# Patient Record
Sex: MALE | Race: OTHER | NOT HISPANIC OR LATINO
[De-identification: names, ages, dates, MRNs, and addresses within clinical notes are randomized per-mention and may not be internally consistent; named-entity substitution may affect disease eponyms.]

---

## 2021-07-03 ENCOUNTER — APPOINTMENT (OUTPATIENT)
Dept: HUMAN REPRODUCTION | Facility: CLINIC | Age: 31
End: 2021-07-03
Payer: COMMERCIAL

## 2021-07-03 PROCEDURE — 99211 OFF/OP EST MAY X REQ PHY/QHP: CPT | Mod: 25

## 2021-07-03 PROCEDURE — 36415 COLL VENOUS BLD VENIPUNCTURE: CPT

## 2021-07-03 PROCEDURE — 99072 ADDL SUPL MATRL&STAF TM PHE: CPT

## 2021-07-10 ENCOUNTER — APPOINTMENT (OUTPATIENT)
Dept: HUMAN REPRODUCTION | Facility: CLINIC | Age: 31
End: 2021-07-10

## 2024-05-31 PROBLEM — Z00.00 ENCOUNTER FOR PREVENTIVE HEALTH EXAMINATION: Status: ACTIVE | Noted: 2024-05-31

## 2024-06-03 ENCOUNTER — APPOINTMENT (OUTPATIENT)
Dept: ORTHOPEDIC SURGERY | Facility: CLINIC | Age: 34
End: 2024-06-03
Payer: COMMERCIAL

## 2024-06-03 VITALS — BODY MASS INDEX: 29.82 KG/M2 | HEIGHT: 73 IN | WEIGHT: 225 LBS

## 2024-06-03 DIAGNOSIS — Z78.9 OTHER SPECIFIED HEALTH STATUS: ICD-10-CM

## 2024-06-03 DIAGNOSIS — S39.012A STRAIN OF MUSCLE, FASCIA AND TENDON OF LOWER BACK, INITIAL ENCOUNTER: ICD-10-CM

## 2024-06-03 PROCEDURE — 99203 OFFICE O/P NEW LOW 30 MIN: CPT

## 2024-06-03 PROCEDURE — 72100 X-RAY EXAM L-S SPINE 2/3 VWS: CPT

## 2024-06-09 NOTE — PHYSICAL EXAM
[Normal Coordination] : normal coordination [Normal DTR UE/LE] : normal DTR UE/LE  [Normal Sensation] : normal sensation [Normal Mood and Affect] : normal mood and affect [Oriented] : oriented [Normal Skin] : normal skin [No Rash] : no rash [No Ulcers] : no ulcers [No Lesions] : no lesions [No obvious lymphadenopathy in areas examined] : no obvious lymphadenopathy in areas examined [5___] : right extensor hallicus longus 5[unfilled]/5 [] : patient ambulates without assistive device [No bony abnormalities] : No bony abnormalities
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
## 2024-06-20 ENCOUNTER — APPOINTMENT (OUTPATIENT)
Dept: ORTHOPEDIC SURGERY | Facility: CLINIC | Age: 34
End: 2024-06-20

## 2024-06-20 VITALS — BODY MASS INDEX: 29.16 KG/M2 | HEIGHT: 73 IN | WEIGHT: 220 LBS

## 2024-06-20 DIAGNOSIS — M25.511 PAIN IN RIGHT SHOULDER: ICD-10-CM

## 2024-06-20 DIAGNOSIS — M25.512 PAIN IN LEFT SHOULDER: ICD-10-CM

## 2024-06-20 PROCEDURE — 99204 OFFICE O/P NEW MOD 45 MIN: CPT | Mod: 1L

## 2024-06-20 PROCEDURE — 73030 X-RAY EXAM OF SHOULDER: CPT | Mod: 1L,LT

## 2024-06-20 PROCEDURE — 72040 X-RAY EXAM NECK SPINE 2-3 VW: CPT | Mod: 1L

## 2024-06-20 PROCEDURE — 73010 X-RAY EXAM OF SHOULDER BLADE: CPT | Mod: 1L,RT

## 2024-06-20 RX ORDER — NAPROXEN 500 MG/1
500 TABLET ORAL
Qty: 60 | Refills: 0 | Status: ACTIVE | COMMUNITY
Start: 2024-06-20 | End: 1900-01-01

## 2024-06-21 NOTE — IMAGING
[Bilateral] : shoulder bilaterally [de-identified] : The patient is a well appearing 34 year male of their stated age. Neck is supple & nontender to palpation. Negative Spurling's test.   Effected Shoulder: LEFT Inspection: Scapula Winging: Negative Deformity: None Erythema: None Ecchymosis: None Abrasions: None Effusion: None   Range of Motion: Active Forward Flexion: 170 degrees Active Abduction: 170 degrees Passive Forward Flexion: 170 degrees Passive Abduction: 170 degrees ER @ 90 degrees: 90degrees IR @ 90 degrees: 20 degrees ER @ 0 degrees: 35 degrees   Motor Exam: Forward Flexion: 5 out of 5 Flexion Plane of Scapula: 5 out of 5 Abduction: 5 out of 5 Internal Rotation: 5 out of 5 External Rotation: 5 out of 5 Distal Motor Strength: 5 out of 5   Stability Testing:  Anterior: 1+ Posterior: 1+ Sulcus N: 1+ Sulcus ER: 1+ Provocative Tests: Drop Arm: Negative Impingement: Negative [place]: Negative X-Arm Adduction: Negative Belly Press: Negative Bear Hug: Negative Lift Off: Negative Apprehension: Negative Relocation: Negative Posterior Load & Shift: Negative Palpation: AC Joint: Nontender Clavicle: Nontender SC Joint: Nontender Bicipital Groove: Nontender Coracoid Process: TENDER  Pectoralis Minor Tendon: Nontender Pectoralis Major Tendon: Nontender & palpably intact Latissimus Dorsi: Nontender Proximal Humerus: Nontender Scapula Body: MID SCAPULA TENDERNESS Medial Scapula Boarder: Nontender Scapula Spine: Nontender Neurologic Exam: Sensation to Light Touch: Axillary: Grossly intact Ulnar: Grossly intact Radial: Grossly intact Median: Grossly intact Other:  N/A Circulatory/Pulses: Ulnar: 2+ Radial: 2+ Other Pertinent Findings: None  >>>>>>>>>>>>>>>>>>>>>>>>>>>>>>>>>>>>>>>>>>>>>>>>>>>>>>>>>>>>>>>>>>>>>>>   Effected Shoulder: RIGHT Inspection: Scapula Winging: Negative Deformity: None Erythema: None Ecchymosis: None Abrasions: None Effusion: None   Range of Motion: Active Forward Flexion: 180 degrees Active Abduction: 180 degrees Passive Forward Flexion: 180 degrees Passive Abduction: 180 degrees ER @ 90 degrees: 90 degrees IR @ 90 degrees: 35 degrees ER @ 0 degrees: 35 degrees   Motor Exam: Forward Flexion: 5 out of 5 Flexion Plane of Scapula: 5- out of 5 Abduction: 5- out of 5 Internal Rotation: 5 out of 5 External Rotation: 5 out of 5 Distal Motor Strength: 5 out of 5   Stability Testing: Anterior: 1+ Posterior: 1+ Sulcus N: 1+ Sulcus ER: 1+   Provocative Tests: Drop Arm: Negative Impingement: Negative [place]: Negative X-Arm Adduction: Negative Belly Press: Negative Bear Hug: Negative Lift Off: Negative Apprehension: Negative Relocation: Negative Posterior Load & Shift: Negative   Palpation: AC Joint: Nontender Nontender Clavicle: Nontender SC Joint: Nontender Bicipital Groove: Nontender Coracoid Process: TENDER  Pectoralis Minor Tendon: Nontender Pectoralis Major Tendon: Nontender & palpably intact Latissimus Dorsi: Nontender Proximal Humerus: Nontender Scapula Body: Nontender Medial Scapula Boarder: Nontender Scapula Spine: Nontender   Neurologic Exam: Sensation to Light Touch: Axillary: Grossly intact Ulnar: Grossly intact Radial: Grossly intact Median: Grossly intact Other:  N/A Circulatory/Pulses: Ulnar: 2+ Radial: 2+ Other Pertinent Findings: None         Assessment: The patient is a 34 year old male with bilateral shoulder pain and radiographic and physical exam findings consistent with bilat upper extremity shoulder pain consistent w cervical HNP/ cervicalgia  The patient's condition is acute Documents/Results Reviewed Today: X-Ray bilateral shoulders, X-Ray cervical spine Tests/Studies Independently Interpreted Today: X-Ray bilateral shoulders reveal evidence of previous surgical intervention [place] 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  X-Ray of cervical spine reveals evidence of straightening of normal lordotic curve otherwise benign  Pertinent findings include: RIGHT SHOULDER: 180/180/90/35/35, 5-/5 ABD, 5/5 FF, 5-/5 FSP,5-/5 IR AND ER, Tender coracoid,  LEFT SHOUDLER: Mid scap border tenderness, Tender Coracoid, 170/170/90/20/35,  Confounding medical conditions/concerns:  [facility] shoulder [facility]     Plan:  Due to worsening pain and instability with mechanical symptoms, patient will obtain MRI cervical spine to evaluate for possible HNP. In the interim, we reviewed appropriate use of OTC anti-inflammatories as needed for pain, inflammation, and discomfort.  Prescribed patient Naproxen 500mg BID x 2 weeks, then PRN for pain management and inflammation - use as directed and take with food. Modify activity as discussed.  Tests Ordered: MRI cervical spine Prescription Medications Ordered: Naproxen 500 mg  Braces/DME Ordered: None Activity/Work/Sports Status: None  Additional Instructions: None Follow-Up:  After MRI  The patient's current medication management of their orthopedic diagnosis was reviewed today: (1) We discussed a comprehensive treatment plan that included possible pharmaceutical management involving the use of prescription strength medications including but not limited to options such as oral Naprosyn 500mg BID, once daily Meloxicam 15 mg, or 500-650 mg Tylenol versus over the counter oral medications and topical prescription NSAID Pennsaid vs over the counter Voltaren gel.  Based on our extensive discussion, the patient was prescribed Naprosyn 500mg BID for two weeks.  It will then be used PRN for pain, inflammation and discomfort. (2) There is a moderate risk of morbidity with further treatment, especially from use of prescription strength medications and possible side effects of these medications which include upset stomach with oral medications, skin reactions to topical medications and cardiac/renal issues with long term use. (3) I recommended that the patient follow-up with their medical physician to discuss any significant specific potential issues with long term medication use such as interactions with current medications or with exacerbation of underlying medical comorbidities. (4) The benefits and risks associated with use of injectable, oral or topical, prescription and over the counter anti-inflammatory medications were discussed with the patient. The patient voiced understanding of the risks including but not limited to bleeding, stroke, kidney dysfunction, heart disease, and were referred to the black box warning label for further information.   Celine BOYLE attest that this documentation has been prepared under the direction and in the presence of Provider Dr. Kumar Corado.    The documentation recorded by the scribe accurately reflects the services IDr. Kumar, personally performed and the decisions made by me.     

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
## 2024-06-22 ENCOUNTER — RESULT REVIEW (OUTPATIENT)
Age: 34
End: 2024-06-22

## 2024-06-27 ENCOUNTER — APPOINTMENT (OUTPATIENT)
Dept: ORTHOPEDIC SURGERY | Facility: CLINIC | Age: 34
End: 2024-06-27

## 2024-06-27 VITALS — BODY MASS INDEX: 29.16 KG/M2 | WEIGHT: 220 LBS | HEIGHT: 73 IN

## 2024-06-27 DIAGNOSIS — M50.20 OTHER CERVICAL DISC DISPLACEMENT, UNSPECIFIED CERVICAL REGION: ICD-10-CM

## 2024-06-27 PROCEDURE — 99204 OFFICE O/P NEW MOD 45 MIN: CPT

## 2024-07-08 ENCOUNTER — APPOINTMENT (OUTPATIENT)
Dept: ORTHOPEDIC SURGERY | Facility: CLINIC | Age: 34
End: 2024-07-08
Payer: COMMERCIAL

## 2024-07-08 VITALS — WEIGHT: 220 LBS | HEIGHT: 73 IN | BODY MASS INDEX: 29.16 KG/M2

## 2024-07-08 DIAGNOSIS — M50.20 OTHER CERVICAL DISC DISPLACEMENT, UNSPECIFIED CERVICAL REGION: ICD-10-CM

## 2024-07-08 DIAGNOSIS — S39.012A STRAIN OF MUSCLE, FASCIA AND TENDON OF LOWER BACK, INITIAL ENCOUNTER: ICD-10-CM

## 2024-07-08 PROCEDURE — 99214 OFFICE O/P EST MOD 30 MIN: CPT

## 2024-07-17 ENCOUNTER — RESULT REVIEW (OUTPATIENT)
Age: 34
End: 2024-07-17

## 2024-08-01 ENCOUNTER — APPOINTMENT (OUTPATIENT)
Dept: PAIN MANAGEMENT | Facility: CLINIC | Age: 34
End: 2024-08-01
Payer: COMMERCIAL

## 2024-08-01 VITALS — WEIGHT: 224 LBS | HEIGHT: 73 IN | BODY MASS INDEX: 29.69 KG/M2

## 2024-08-01 DIAGNOSIS — S13.4XXA SPRAIN OF LIGAMENTS OF CERVICAL SPINE, INITIAL ENCOUNTER: ICD-10-CM

## 2024-08-01 PROCEDURE — 99214 OFFICE O/P EST MOD 30 MIN: CPT

## 2024-08-01 PROCEDURE — 99204 OFFICE O/P NEW MOD 45 MIN: CPT

## 2024-08-02 PROBLEM — S13.4XXA CHRONIC WHIPLASH INJURY, INITIAL ENCOUNTER: Status: RESOLVED | Noted: 2024-08-01 | Resolved: 2024-08-02

## 2024-08-02 NOTE — PHYSICAL EXAM
[de-identified] : Constitutional:   - No acute distress   - Well developed; well nourished    Neurological:   - normal mood and affect   - alert and oriented x 3     Cardiovascular:   - grossly normal   Cervical Spine Exam:   Inspection:   erythema (-)   ecchymosis (-)   rashes (-)    Palpation:                                                    Cervical paraspinal tenderness:         R (-); L (-)  Upper trapezius tenderness:              R (+); L (+)  Rhomboids tenderness:                      R (-); L (-)  Occipital Ridge:                                   R (-); L (-)  Supraspinatus tenderness:                 R (-); L (-)   ROM: WNL Pain with extremes of flexion  Strength Testing:              Deltoid                           R (5/5); L (5/5)  Biceps:                          R (5/5); L (5/5)  Triceps:                         R (5/5); L (5/5)  Finger Abductors:         R (5/5); L (5/5)  Grasp:                           R (5/5); L (5/5)   Special Testing:  Spurling Test:                  R (-); L (-)  Facet load test:               R (-); L (-)   Neuro:  SILT throughout right upper extremity  SILT throughout left upper extremity   Reflexes:  Biceps   -           R (2+); L (2+)  Triceps  -           R (2+); L (2+)  Brachioradialis- R (2+); L (2+)     No ankle clonus
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
## 2024-08-02 NOTE — REASON FOR VISIT
[Initial Consultation] : an initial pain management consultation [FreeTextEntry2] : Neck/low back pain pain
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
## 2024-08-02 NOTE — PHYSICAL EXAM
[de-identified] : Constitutional:   - No acute distress   - Well developed; well nourished    Neurological:   - normal mood and affect   - alert and oriented x 3     Cardiovascular:   - grossly normal   Cervical Spine Exam:   Inspection:   erythema (-)   ecchymosis (-)   rashes (-)    Palpation:                                                    Cervical paraspinal tenderness:         R (-); L (-)  Upper trapezius tenderness:              R (+); L (+)  Rhomboids tenderness:                      R (-); L (-)  Occipital Ridge:                                   R (-); L (-)  Supraspinatus tenderness:                 R (-); L (-)   ROM: WNL Pain with extremes of flexion  Strength Testing:              Deltoid                           R (5/5); L (5/5)  Biceps:                          R (5/5); L (5/5)  Triceps:                         R (5/5); L (5/5)  Finger Abductors:         R (5/5); L (5/5)  Grasp:                           R (5/5); L (5/5)   Special Testing:  Spurling Test:                  R (-); L (-)  Facet load test:               R (-); L (-)   Neuro:  SILT throughout right upper extremity  SILT throughout left upper extremity   Reflexes:  Biceps   -           R (2+); L (2+)  Triceps  -           R (2+); L (2+)  Brachioradialis- R (2+); L (2+)     No ankle clonus

## 2024-08-02 NOTE — ASSESSMENT
[FreeTextEntry1] : A discussion regarding available pain management treatment options occurred with the patient.  These included interventional, rehabilitative, pharmacological, and alternative modalities. We will proceed with the following:    Interventional treatment options: - None indicated at present time  - We have mutually agreed to defer interventional treatment until exhausting further conservative therapy - Discussed trial of TPI for refractory cervical myofascial pain - see additional instructions below    Rehabilitative options:   - continue physical therapy   - participation in active HEP was discussed and encouraged as tolerated  Medication based treatment options:   -Continue naproxen 500 mg up to BID as needed -Patient prefers to avoid medication based therapies overall - see additional instructions below    Complementary treatment options:   - Weight management and lifestyle modifications discussed - Discussed options including massage therapy, acupuncture, and chiropractic  Additional treatment recommendations as follows:   - patient will follow-up in 6 weeks or as-needed basis  We have discussed the risks, benefits, and alternatives for NSAID therapy including but not limited to the risk of bleeding, thrombosis, gastric mucosal irritation/ulceration, allergic reaction and kidney dysfunction.  The patient verbalizes an understanding.  The documentation recorded by the scribe, in my presence, accurately reflects the service I personally performed and the decisions made by me with my edits as appropriate.   I, Korey Queen acting as scribe, attest that this documentation has been prepared under the direction and in the presence of Provider New Andres DO.

## 2024-08-05 ENCOUNTER — APPOINTMENT (OUTPATIENT)
Dept: ORTHOPEDIC SURGERY | Facility: CLINIC | Age: 34
End: 2024-08-05

## 2024-08-05 PROBLEM — M79.18 MYOFASCIAL PAIN SYNDROME, CERVICAL: Status: ACTIVE | Noted: 2024-08-02

## 2024-08-05 PROBLEM — M54.12 RADICULITIS, CERVICAL: Status: ACTIVE | Noted: 2024-08-01

## 2024-08-05 PROCEDURE — 99213 OFFICE O/P EST LOW 20 MIN: CPT

## 2024-08-11 NOTE — IMAGING
[de-identified] : The patient is a well appearing 34 year male of their stated age. Neck is supple & nontender to palpation. Negative Spurling's test.   Effected Shoulder: LEFT Inspection: Scapula Winging: Negative Deformity: None Erythema: None Ecchymosis: None Abrasions: None Effusion: None   Range of Motion: Active Forward Flexion: 170 degrees Active Abduction: 170 degrees Passive Forward Flexion: 170 degrees Passive Abduction: 170 degrees ER @ 90 degrees: 90degrees IR @ 90 degrees: 20 degrees ER @ 0 degrees: 35 degrees   Motor Exam: Forward Flexion: 5 out of 5 Flexion Plane of Scapula: 5 out of 5 Abduction: 5 out of 5 Internal Rotation: 5 out of 5 External Rotation: 5 out of 5 Distal Motor Strength: 5 out of 5   Stability Testing:  Anterior: 1+ Posterior: 1+ Sulcus N: 1+ Sulcus ER: 1+ Provocative Tests: Drop Arm: Negative Impingement: Negative [place]: Negative X-Arm Adduction: Negative Belly Press: Negative Bear Hug: Negative Lift Off: Negative Apprehension: Negative Relocation: Negative Posterior Load & Shift: Negative Palpation: AC Joint: Nontender Clavicle: Nontender SC Joint: Nontender Bicipital Groove: Nontender Coracoid Process: TENDER  Pectoralis Minor Tendon: Nontender Pectoralis Major Tendon: Nontender & palpably intact Latissimus Dorsi: Nontender Proximal Humerus: Nontender Scapula Body: MID SCAPULA TENDERNESS Medial Scapula Boarder: Nontender Scapula Spine: Nontender Neurologic Exam: Sensation to Light Touch: Axillary: Grossly intact Ulnar: Grossly intact Radial: Grossly intact Median: Grossly intact Other:  N/A Circulatory/Pulses: Ulnar: 2+ Radial: 2+ Other Pertinent Findings: None  >>>>>>>>>>>>>>>>>>>>>>>>>>>>>>>>>>>>>>>>>>>>>>>>>>>>>>>>>>>>>>>>>>>>>>>   Effected Shoulder: RIGHT Inspection: Scapula Winging: Negative Deformity: None Erythema: None Ecchymosis: None Abrasions: None Effusion: None   Range of Motion: Active Forward Flexion: 180 degrees Active Abduction: 180 degrees Passive Forward Flexion: 180 degrees Passive Abduction: 180 degrees ER @ 90 degrees: 90 degrees IR @ 90 degrees: 35 degrees ER @ 0 degrees: 35 degrees   Motor Exam: Forward Flexion: 5 out of 5 Flexion Plane of Scapula: 5- out of 5 Abduction: 5- out of 5 Internal Rotation: 5 out of 5 External Rotation: 5 out of 5 Distal Motor Strength: 5 out of 5   Stability Testing: Anterior: 1+ Posterior: 1+ Sulcus N: 1+ Sulcus ER: 1+   Provocative Tests: Drop Arm: Negative Impingement: Negative [place]: Negative X-Arm Adduction: Negative Belly Press: Negative Bear Hug: Negative Lift Off: Negative Apprehension: Negative Relocation: Negative Posterior Load & Shift: Negative   Palpation: AC Joint: Nontender Nontender Clavicle: Nontender SC Joint: Nontender Bicipital Groove: Nontender Coracoid Process: TENDER  Pectoralis Minor Tendon: Nontender Pectoralis Major Tendon: Nontender & palpably intact Latissimus Dorsi: Nontender Proximal Humerus: Nontender Scapula Body: Nontender Medial Scapula Boarder: Nontender Scapula Spine: Nontender   Neurologic Exam: Sensation to Light Touch: Axillary: Grossly intact Ulnar: Grossly intact Radial: Grossly intact Median: Grossly intact Other:  N/A Circulatory/Pulses: Ulnar: 2+ Radial: 2+ Other Pertinent Findings: None   Assessment: The patient is a 34 year old male with bilateral shoulder pain and radiographic and physical exam findings consistent with cervical disc herniation The patient's condition is acute Documents/Results Reviewed Today: MRI cervical spine Tests/Studies Independently Interpreted Today: MRI cervical spine reveals evidence of C5-C6, C6-C7 disc herniation worse on left X-Ray of cervical spine reveals evidence of straightening of normal lordotic curve otherwise benign  Pertinent findings include: RIGHT SHOULDER: 180/180/90/35/35, 5-/5 ABD, 5/5 FF, 5-/5 FSP,5-/5 IR AND ER, Tender coracoid,  LEFT SHOUDLER: Mid scap border tenderness, Tender Coracoid, 170/170/90/20/35,  Confounding medical conditions/concerns:  Left shoulder Labral repair with Dr. Corado at [facility] in 2017   Plan: Discussed treatment options for the patient's disc herniation. Patient will start physical therapy, HEP and stretching. The patient is prescribed a Medrol dose pack to be taken as directed for the next five days - no NSAID medication should be taken concurrently with steroid pack. Upon completion of MDP, the patient may take OTC oral NSAIDs as needed. He will follow up with pain Managment.  Tests Ordered: None Prescription Medications Ordered: Medrol dose pack  Braces/DME Ordered: None Activity/Work/Sports Status: None  Additional Instructions: None Follow-Up: with pain management   The patient's current medication management of their orthopedic diagnosis was reviewed today: (1) We discussed a comprehensive treatment plan that included possible pharmaceutical management involving the use of prescription strength medications including but not limited to options such as oral Naprosyn 500mg BID, once daily Meloxicam 15 mg, or 500-650 mg Tylenol versus over the counter oral medications and topical prescription NSAID Pennsaid vs over the counter Voltaren gel.  Based on our extensive discussion, the patient was prescribed a Medrol Dose Pack to be taken as directed for the next five days.  Following which OTC NSAIDs may be used PRN for pain, inflammation, and discomfort.  No NSAID medications should be taken concurrently with the Medrol Dose Pack. (2) There is a moderate risk of morbidity with further treatment, especially from use of prescription strength medications and possible side effects of these medications which include upset stomach with oral medications, skin reactions to topical medications and cardiac/renal issues with long term use. (3) I recommended that the patient follow-up with their medical physician to discuss any significant specific potential issues with long term medication use such as interactions with current medications or with exacerbation of underlying medical comorbidities. (4) The benefits and risks associated with use of injectable, oral or topical, prescription and over the counter anti-inflammatory medications were discussed with the patient. The patient voiced understanding of the risks including but not limited to bleeding, stroke, kidney dysfunction, heart disease, and were referred to the black box warning label for further information.  Celine BOYLE attest that this documentation has been prepared under the direction and in the presence of Provider Dr. Kumar Corado.  The documentation recorded by the scribe accurately reflects the services Dr. Kumar BOYLE, personally performed and the decisions made by me.

## 2024-08-11 NOTE — PHYSICAL EXAM
[Normal Coordination] : normal coordination [Normal DTR UE/LE] : normal DTR UE/LE  [Normal Sensation] : normal sensation [Normal Mood and Affect] : normal mood and affect [Oriented] : oriented [Normal Skin] : normal skin [No Rash] : no rash [No Ulcers] : no ulcers [No Lesions] : no lesions [No obvious lymphadenopathy in areas examined] : no obvious lymphadenopathy in areas examined [NL (90)] : forward flexion 90 degrees [Bending to left] : bending to left [Bending to right] : bending to right [5___] : right extensor hallicus longus 5[unfilled]/5 [No bony abnormalities] : No bony abnormalities [NL (80)] : right lateral rotation 80 degrees [TWNoteComboBox7] : forward flexion 30 degrees [] : clonus not sustained at ankle [de-identified] : mild + alarcon on LT.  [de-identified] : extension 20 degrees [de-identified] : left lateral bending 20 degrees [de-identified] : right lateral bending 20 degrees

## 2024-08-11 NOTE — DISCUSSION/SUMMARY
[de-identified] : 34 M with cervical & lumbar spondylosis.  Symptoms appear mostly muscular in nature. Normal neuro exam today, MRIs reviewed & discussed with patient today. Best to treat conservatively at this time. He already has established care with Dr. Andres, may follow up if needed. Patient will continue with current physical therapy routine, renewal given due to patients overall reduction in pain and mobility continuing cervical & lumbar physical therapy will be helpful at this time.   F/U in 2 months.   Prior to appointment and during encounter with patient extensive medical records were reviewed including but not limited to, hospital records, out patient records, imaging results, and lab data. During this appointment the patient was examined, diagnoses were discussed and explained in a face to face manner. In addition extensive time was spent reviewing aforementioned diagnostic studies. Counseling including abnormal image results, differential diagnoses, treatment options, risk and benefits, lifestyle changes, current condition, and current medications was performed. Patient's comments, questions, and concerns were address and patient verbalized understanding. Based on this patient's presentation at our office, which is an orthopedic spine surgeon's office, this patient inherently / intrinsically has a risk, however minute, of developing issues such as Cauda equina syndrome, bowel and bladder changes, or progression of motor or neurological deficits such as paralysis which may be permanent.   I, Belinda Olvera, attest that this documentation has been prepared under the direction and in the presence of provider Markel James MD.

## 2024-08-11 NOTE — IMAGING
[de-identified] : The patient is a well appearing 34 year male of their stated age. Neck is supple & nontender to palpation. Negative Spurling's test.   Effected Shoulder: LEFT Inspection: Scapula Winging: Negative Deformity: None Erythema: None Ecchymosis: None Abrasions: None Effusion: None   Range of Motion: Active Forward Flexion: 170 degrees Active Abduction: 170 degrees Passive Forward Flexion: 170 degrees Passive Abduction: 170 degrees ER @ 90 degrees: 90degrees IR @ 90 degrees: 20 degrees ER @ 0 degrees: 35 degrees   Motor Exam: Forward Flexion: 5 out of 5 Flexion Plane of Scapula: 5 out of 5 Abduction: 5 out of 5 Internal Rotation: 5 out of 5 External Rotation: 5 out of 5 Distal Motor Strength: 5 out of 5   Stability Testing:  Anterior: 1+ Posterior: 1+ Sulcus N: 1+ Sulcus ER: 1+ Provocative Tests: Drop Arm: Negative Impingement: Negative [place]: Negative X-Arm Adduction: Negative Belly Press: Negative Bear Hug: Negative Lift Off: Negative Apprehension: Negative Relocation: Negative Posterior Load & Shift: Negative Palpation: AC Joint: Nontender Clavicle: Nontender SC Joint: Nontender Bicipital Groove: Nontender Coracoid Process: TENDER  Pectoralis Minor Tendon: Nontender Pectoralis Major Tendon: Nontender & palpably intact Latissimus Dorsi: Nontender Proximal Humerus: Nontender Scapula Body: MID SCAPULA TENDERNESS Medial Scapula Boarder: Nontender Scapula Spine: Nontender Neurologic Exam: Sensation to Light Touch: Axillary: Grossly intact Ulnar: Grossly intact Radial: Grossly intact Median: Grossly intact Other:  N/A Circulatory/Pulses: Ulnar: 2+ Radial: 2+ Other Pertinent Findings: None  >>>>>>>>>>>>>>>>>>>>>>>>>>>>>>>>>>>>>>>>>>>>>>>>>>>>>>>>>>>>>>>>>>>>>>>   Effected Shoulder: RIGHT Inspection: Scapula Winging: Negative Deformity: None Erythema: None Ecchymosis: None Abrasions: None Effusion: None   Range of Motion: Active Forward Flexion: 180 degrees Active Abduction: 180 degrees Passive Forward Flexion: 180 degrees Passive Abduction: 180 degrees ER @ 90 degrees: 90 degrees IR @ 90 degrees: 35 degrees ER @ 0 degrees: 35 degrees   Motor Exam: Forward Flexion: 5 out of 5 Flexion Plane of Scapula: 5- out of 5 Abduction: 5- out of 5 Internal Rotation: 5 out of 5 External Rotation: 5 out of 5 Distal Motor Strength: 5 out of 5   Stability Testing: Anterior: 1+ Posterior: 1+ Sulcus N: 1+ Sulcus ER: 1+   Provocative Tests: Drop Arm: Negative Impingement: Negative [place]: Negative X-Arm Adduction: Negative Belly Press: Negative Bear Hug: Negative Lift Off: Negative Apprehension: Negative Relocation: Negative Posterior Load & Shift: Negative   Palpation: AC Joint: Nontender Nontender Clavicle: Nontender SC Joint: Nontender Bicipital Groove: Nontender Coracoid Process: TENDER  Pectoralis Minor Tendon: Nontender Pectoralis Major Tendon: Nontender & palpably intact Latissimus Dorsi: Nontender Proximal Humerus: Nontender Scapula Body: Nontender Medial Scapula Boarder: Nontender Scapula Spine: Nontender   Neurologic Exam: Sensation to Light Touch: Axillary: Grossly intact Ulnar: Grossly intact Radial: Grossly intact Median: Grossly intact Other:  N/A Circulatory/Pulses: Ulnar: 2+ Radial: 2+ Other Pertinent Findings: None   Assessment: The patient is a 34 year old male with bilateral shoulder pain and radiographic and physical exam findings consistent with cervical disc herniation The patient's condition is acute Documents/Results Reviewed Today: MRI cervical spine Tests/Studies Independently Interpreted Today: MRI cervical spine reveals evidence of C5-C6, C6-C7 disc herniation worse on left X-Ray of cervical spine reveals evidence of straightening of normal lordotic curve otherwise benign  Pertinent findings include: RIGHT SHOULDER: 180/180/90/35/35, 5-/5 ABD, 5/5 FF, 5-/5 FSP,5-/5 IR AND ER, Tender coracoid,  LEFT SHOUDLER: Mid scap border tenderness, Tender Coracoid, 170/170/90/20/35,  Confounding medical conditions/concerns:  Left shoulder Labral repair with Dr. Corado at [facility] in 2017   Plan: Discussed treatment options for the patient's disc herniation. Patient will start physical therapy, HEP and stretching. The patient is prescribed a Medrol dose pack to be taken as directed for the next five days - no NSAID medication should be taken concurrently with steroid pack. Upon completion of MDP, the patient may take OTC oral NSAIDs as needed. He will follow up with pain Managment.  Tests Ordered: None Prescription Medications Ordered: Medrol dose pack  Braces/DME Ordered: None Activity/Work/Sports Status: None  Additional Instructions: None Follow-Up: with pain management   The patient's current medication management of their orthopedic diagnosis was reviewed today: (1) We discussed a comprehensive treatment plan that included possible pharmaceutical management involving the use of prescription strength medications including but not limited to options such as oral Naprosyn 500mg BID, once daily Meloxicam 15 mg, or 500-650 mg Tylenol versus over the counter oral medications and topical prescription NSAID Pennsaid vs over the counter Voltaren gel.  Based on our extensive discussion, the patient was prescribed a Medrol Dose Pack to be taken as directed for the next five days.  Following which OTC NSAIDs may be used PRN for pain, inflammation, and discomfort.  No NSAID medications should be taken concurrently with the Medrol Dose Pack. (2) There is a moderate risk of morbidity with further treatment, especially from use of prescription strength medications and possible side effects of these medications which include upset stomach with oral medications, skin reactions to topical medications and cardiac/renal issues with long term use. (3) I recommended that the patient follow-up with their medical physician to discuss any significant specific potential issues with long term medication use such as interactions with current medications or with exacerbation of underlying medical comorbidities. (4) The benefits and risks associated with use of injectable, oral or topical, prescription and over the counter anti-inflammatory medications were discussed with the patient. The patient voiced understanding of the risks including but not limited to bleeding, stroke, kidney dysfunction, heart disease, and were referred to the black box warning label for further information.  Celine BOYLE attest that this documentation has been prepared under the direction and in the presence of Provider Dr. Kumar Corado.  The documentation recorded by the scribe accurately reflects the services Dr. Kumar BOYLE, personally performed and the decisions made by me.

## 2024-08-11 NOTE — PHYSICAL EXAM
[Normal Coordination] : normal coordination [Normal DTR UE/LE] : normal DTR UE/LE  [Normal Sensation] : normal sensation [Normal Mood and Affect] : normal mood and affect [Oriented] : oriented [Normal Skin] : normal skin [No Rash] : no rash [No Ulcers] : no ulcers [No Lesions] : no lesions [No obvious lymphadenopathy in areas examined] : no obvious lymphadenopathy in areas examined [NL (90)] : forward flexion 90 degrees [Bending to left] : bending to left [Bending to right] : bending to right [5___] : right extensor hallicus longus 5[unfilled]/5 [No bony abnormalities] : No bony abnormalities [NL (80)] : right lateral rotation 80 degrees [TWNoteComboBox7] : forward flexion 30 degrees [] : non-antalgic [de-identified] : mild + alarcon on LT.  [de-identified] : extension 20 degrees [de-identified] : left lateral bending 20 degrees [de-identified] : right lateral bending 20 degrees

## 2024-08-11 NOTE — DATA REVIEWED
[MRI] : MRI [Cervical Spine] : cervical spine [Report was reviewed and noted in the chart] : The report was reviewed and noted in the chart [I independently reviewed and interpreted images and report] : I independently reviewed and interpreted images and report [I reviewed the films/CD and additionally noted] : I reviewed the films/CD and additionally noted [FreeTextEntry1] : On my interpretation of these images from [facility] on 07/17/24.  I have additionally reviewed the radiologist report. LUMBAR MRI- sagittal & axial view.  L5-S1:  LT sided mild fs L4-5: disc bulge L3-4: nl L2-3: nl   L1-2: nl  T12-L1: nl    On my interpretation of these images from 6/22/24 [facility] I have additionally reviewed the radiologist report. CERVICAL MRI C2-3: normal C3-4: mild DDD, mild BL FS C4-5: disc bulge C5-6: mild FS, mild facet hypertrophy. C6-7: normal C7-T1: normal

## 2024-08-11 NOTE — DISCUSSION/SUMMARY
[de-identified] : 34 M with cervical & lumbar spondylosis.  Symptoms appear mostly muscular in nature. Normal neuro exam today, MRIs reviewed & discussed with patient today. Best to treat conservatively at this time. He already has established care with Dr. Andres, may follow up if needed. Patient will continue with current physical therapy routine, renewal given due to patients overall reduction in pain and mobility continuing cervical & lumbar physical therapy will be helpful at this time.   F/U in 2 months.   Prior to appointment and during encounter with patient extensive medical records were reviewed including but not limited to, hospital records, out patient records, imaging results, and lab data. During this appointment the patient was examined, diagnoses were discussed and explained in a face to face manner. In addition extensive time was spent reviewing aforementioned diagnostic studies. Counseling including abnormal image results, differential diagnoses, treatment options, risk and benefits, lifestyle changes, current condition, and current medications was performed. Patient's comments, questions, and concerns were address and patient verbalized understanding. Based on this patient's presentation at our office, which is an orthopedic spine surgeon's office, this patient inherently / intrinsically has a risk, however minute, of developing issues such as Cauda equina syndrome, bowel and bladder changes, or progression of motor or neurological deficits such as paralysis which may be permanent.   I, Belinda Olvera, attest that this documentation has been prepared under the direction and in the presence of provider Markel James MD.

## 2024-08-11 NOTE — DATA REVIEWED
[MRI] : MRI [Cervical Spine] : cervical spine [Report was reviewed and noted in the chart] : The report was reviewed and noted in the chart [I independently reviewed and interpreted images and report] : I independently reviewed and interpreted images and report [I reviewed the films/CD and additionally noted] : I reviewed the films/CD and additionally noted [FreeTextEntry1] : On my interpretation of these images from [facility] on 07/17/24.  I have additionally reviewed the radiologist report. LUMBAR MRI- sagittal & axial view.  L5-S1:  LT sided mild fs L4-5: disc bulge L3-4: nl L2-3: nl   L1-2: nl  T12-L1: nl    On my interpretation of these images from 6/22/24 [facility] I have additionally reviewed the radiologist report. CERVICAL MRI C2-3: normal C3-4: mild DDD, mild BL FS C4-5: disc bulge C5-6: mild FS, mild facet hypertrophy. C6-7: normal C7-T1: normal
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
## 2024-08-13 ENCOUNTER — TRANSCRIPTION ENCOUNTER (OUTPATIENT)
Age: 34
End: 2024-08-13

## 2024-09-30 ENCOUNTER — APPOINTMENT (OUTPATIENT)
Dept: ORTHOPEDIC SURGERY | Facility: CLINIC | Age: 34
End: 2024-09-30
Payer: COMMERCIAL

## 2024-09-30 VITALS — HEIGHT: 73 IN | BODY MASS INDEX: 29.82 KG/M2 | WEIGHT: 225 LBS

## 2024-09-30 DIAGNOSIS — M79.18 MYALGIA, OTHER SITE: ICD-10-CM

## 2024-09-30 DIAGNOSIS — M50.20 OTHER CERVICAL DISC DISPLACEMENT, UNSPECIFIED CERVICAL REGION: ICD-10-CM

## 2024-09-30 DIAGNOSIS — S39.012A STRAIN OF MUSCLE, FASCIA AND TENDON OF LOWER BACK, INITIAL ENCOUNTER: ICD-10-CM

## 2024-09-30 PROCEDURE — 99213 OFFICE O/P EST LOW 20 MIN: CPT

## 2024-10-05 NOTE — PHYSICAL EXAM
[Bending to left] : bending to left [Bending to right] : bending to right [de-identified] : mild + alarcon on LT.  [Normal Coordination] : normal coordination [Normal DTR UE/LE] : normal DTR UE/LE  [Normal Sensation] : normal sensation [Normal Mood and Affect] : normal mood and affect [Oriented] : oriented [Normal Skin] : normal skin [No Rash] : no rash [No Ulcers] : no ulcers [No Lesions] : no lesions [No obvious lymphadenopathy in areas examined] : no obvious lymphadenopathy in areas examined [NL (45)] : right lateral flexion 45 degrees [NL (80)] : right lateral rotation 80 degrees [Biceps 2+] : biceps 2+ [Triceps 2+] : triceps 2+ [Brachioradialis 2+] : brachioradialis 2+ [NL (90)] : forward flexion 90 degrees [NL (30)] : right lateral bending 30 degrees [5___] : right extensor hallicus longus 5[unfilled]/5 [No bony abnormalities] : No bony abnormalities [TWNoteComboBox7] : False [] : non-antalgic [de-identified] : False [de-identified] : False [de-identified] : False

## 2024-10-05 NOTE — DISCUSSION/SUMMARY
[de-identified] : 34 M with cervical & lumbar spondylosis.  Symptoms appear mostly muscular in nature. Normal neuro cervical & lumbar exam today. Best to treat conservatively at this time. He already has established care with Dr. Andres, may follow up if needed. Patient will continue with current physical therapy routine, renewal given due to patient's overall reduction in pain and mobility continuing cervical & lumbar physical therapy will be helpful at this time.  Medical massage prescription given today to aid in symptom control. Discussed treating with acupuncture if pt wishes.   Moving forward I'd like to see as needed.   Prior to appointment and during encounter with patient extensive medical records were reviewed including but not limited to, hospital records, out patient records, imaging results, and lab data. During this appointment the patient was examined, diagnoses were discussed and explained in a face to face manner. In addition extensive time was spent reviewing aforementioned diagnostic studies. Counseling including abnormal image results, differential diagnoses, treatment options, risk and benefits, lifestyle changes, current condition, and current medications was performed. Patient's comments, questions, and concerns were address and patient verbalized understanding. Based on this patient's presentation at our office, which is an orthopedic spine surgeon's office, this patient inherently / intrinsically has a risk, however minute, of developing issues such as Cauda equina syndrome, bowel and bladder changes, or progression of motor or neurological deficits such as paralysis which may be permanent.   I, Belinda Olvera, attest that this documentation has been prepared under the direction and in the presence of provider Markel James MD.

## 2024-10-05 NOTE — IMAGING
[de-identified] : The patient is a well appearing 34 year male of their stated age. Neck is supple & nontender to palpation. Negative Spurling's test.   Effected Shoulder: LEFT Inspection: Scapula Winging: Negative Deformity: None Erythema: None Ecchymosis: None Abrasions: None Effusion: None   Range of Motion: Active Forward Flexion: 170 degrees Active Abduction: 170 degrees Passive Forward Flexion: 170 degrees Passive Abduction: 170 degrees ER @ 90 degrees: 90degrees IR @ 90 degrees: 20 degrees ER @ 0 degrees: 35 degrees   Motor Exam: Forward Flexion: 5 out of 5 Flexion Plane of Scapula: 5 out of 5 Abduction: 5 out of 5 Internal Rotation: 5 out of 5 External Rotation: 5 out of 5 Distal Motor Strength: 5 out of 5   Stability Testing:  Anterior: 1+ Posterior: 1+ Sulcus N: 1+ Sulcus ER: 1+ Provocative Tests: Drop Arm: Negative Impingement: Negative [place]: Negative X-Arm Adduction: Negative Belly Press: Negative Bear Hug: Negative Lift Off: Negative Apprehension: Negative Relocation: Negative Posterior Load & Shift: Negative Palpation: AC Joint: Nontender Clavicle: Nontender SC Joint: Nontender Bicipital Groove: Nontender Coracoid Process: TENDER  Pectoralis Minor Tendon: Nontender Pectoralis Major Tendon: Nontender & palpably intact Latissimus Dorsi: Nontender Proximal Humerus: Nontender Scapula Body: MID SCAPULA TENDERNESS Medial Scapula Boarder: Nontender Scapula Spine: Nontender Neurologic Exam: Sensation to Light Touch: Axillary: Grossly intact Ulnar: Grossly intact Radial: Grossly intact Median: Grossly intact Other:  N/A Circulatory/Pulses: Ulnar: 2+ Radial: 2+ Other Pertinent Findings: None  >>>>>>>>>>>>>>>>>>>>>>>>>>>>>>>>>>>>>>>>>>>>>>>>>>>>>>>>>>>>>>>>>>>>>>>   Effected Shoulder: RIGHT Inspection: Scapula Winging: Negative Deformity: None Erythema: None Ecchymosis: None Abrasions: None Effusion: None   Range of Motion: Active Forward Flexion: 180 degrees Active Abduction: 180 degrees Passive Forward Flexion: 180 degrees Passive Abduction: 180 degrees ER @ 90 degrees: 90 degrees IR @ 90 degrees: 35 degrees ER @ 0 degrees: 35 degrees   Motor Exam: Forward Flexion: 5 out of 5 Flexion Plane of Scapula: 5- out of 5 Abduction: 5- out of 5 Internal Rotation: 5 out of 5 External Rotation: 5 out of 5 Distal Motor Strength: 5 out of 5   Stability Testing: Anterior: 1+ Posterior: 1+ Sulcus N: 1+ Sulcus ER: 1+   Provocative Tests: Drop Arm: Negative Impingement: Negative [place]: Negative X-Arm Adduction: Negative Belly Press: Negative Bear Hug: Negative Lift Off: Negative Apprehension: Negative Relocation: Negative Posterior Load & Shift: Negative   Palpation: AC Joint: Nontender Nontender Clavicle: Nontender SC Joint: Nontender Bicipital Groove: Nontender Coracoid Process: TENDER  Pectoralis Minor Tendon: Nontender Pectoralis Major Tendon: Nontender & palpably intact Latissimus Dorsi: Nontender Proximal Humerus: Nontender Scapula Body: Nontender Medial Scapula Boarder: Nontender Scapula Spine: Nontender   Neurologic Exam: Sensation to Light Touch: Axillary: Grossly intact Ulnar: Grossly intact Radial: Grossly intact Median: Grossly intact Other:  N/A Circulatory/Pulses: Ulnar: 2+ Radial: 2+ Other Pertinent Findings: None   Assessment: The patient is a 34 year old male with bilateral shoulder pain and radiographic and physical exam findings consistent with cervical disc herniation The patient's condition is acute Documents/Results Reviewed Today: MRI cervical spine Tests/Studies Independently Interpreted Today: MRI cervical spine reveals evidence of C5-C6, C6-C7 disc herniation worse on left X-Ray of cervical spine reveals evidence of straightening of normal lordotic curve otherwise benign  Pertinent findings include: RIGHT SHOULDER: 180/180/90/35/35, 5-/5 ABD, 5/5 FF, 5-/5 FSP,5-/5 IR AND ER, Tender coracoid,  LEFT SHOUDLER: Mid scap border tenderness, Tender Coracoid, 170/170/90/20/35,  Confounding medical conditions/concerns:  Left shoulder Labral repair with Dr. Corado at [facility] in 2017   Plan: Discussed treatment options for the patient's disc herniation. Patient will start physical therapy, HEP and stretching. The patient is prescribed a Medrol dose pack to be taken as directed for the next five days - no NSAID medication should be taken concurrently with steroid pack. Upon completion of MDP, the patient may take OTC oral NSAIDs as needed. He will follow up with pain Managment.  Tests Ordered: None Prescription Medications Ordered: Medrol dose pack  Braces/DME Ordered: None Activity/Work/Sports Status: None  Additional Instructions: None Follow-Up: with pain management   The patient's current medication management of their orthopedic diagnosis was reviewed today: (1) We discussed a comprehensive treatment plan that included possible pharmaceutical management involving the use of prescription strength medications including but not limited to options such as oral Naprosyn 500mg BID, once daily Meloxicam 15 mg, or 500-650 mg Tylenol versus over the counter oral medications and topical prescription NSAID Pennsaid vs over the counter Voltaren gel.  Based on our extensive discussion, the patient was prescribed a Medrol Dose Pack to be taken as directed for the next five days.  Following which OTC NSAIDs may be used PRN for pain, inflammation, and discomfort.  No NSAID medications should be taken concurrently with the Medrol Dose Pack. (2) There is a moderate risk of morbidity with further treatment, especially from use of prescription strength medications and possible side effects of these medications which include upset stomach with oral medications, skin reactions to topical medications and cardiac/renal issues with long term use. (3) I recommended that the patient follow-up with their medical physician to discuss any significant specific potential issues with long term medication use such as interactions with current medications or with exacerbation of underlying medical comorbidities. (4) The benefits and risks associated with use of injectable, oral or topical, prescription and over the counter anti-inflammatory medications were discussed with the patient. The patient voiced understanding of the risks including but not limited to bleeding, stroke, kidney dysfunction, heart disease, and were referred to the black box warning label for further information.  Celine BOYLE attest that this documentation has been prepared under the direction and in the presence of Provider Dr. Kumar Corado.  The documentation recorded by the scribe accurately reflects the services Dr. Kumar BOYLE, personally performed and the decisions made by me.
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
## 2024-10-05 NOTE — DISCUSSION/SUMMARY
[de-identified] : 34 M with cervical & lumbar spondylosis.  Symptoms appear mostly muscular in nature. Normal neuro cervical & lumbar exam today. Best to treat conservatively at this time. He already has established care with Dr. Andres, may follow up if needed. Patient will continue with current physical therapy routine, renewal given due to patient's overall reduction in pain and mobility continuing cervical & lumbar physical therapy will be helpful at this time.  Medical massage prescription given today to aid in symptom control. Discussed treating with acupuncture if pt wishes.   Moving forward I'd like to see as needed.   Prior to appointment and during encounter with patient extensive medical records were reviewed including but not limited to, hospital records, out patient records, imaging results, and lab data. During this appointment the patient was examined, diagnoses were discussed and explained in a face to face manner. In addition extensive time was spent reviewing aforementioned diagnostic studies. Counseling including abnormal image results, differential diagnoses, treatment options, risk and benefits, lifestyle changes, current condition, and current medications was performed. Patient's comments, questions, and concerns were address and patient verbalized understanding. Based on this patient's presentation at our office, which is an orthopedic spine surgeon's office, this patient inherently / intrinsically has a risk, however minute, of developing issues such as Cauda equina syndrome, bowel and bladder changes, or progression of motor or neurological deficits such as paralysis which may be permanent.   I, Belinda Olvera, attest that this documentation has been prepared under the direction and in the presence of provider Markel James MD.

## 2024-10-05 NOTE — DATA REVIEWED
[MRI] : MRI [Cervical Spine] : cervical spine [Report was reviewed and noted in the chart] : The report was reviewed and noted in the chart [I independently reviewed and interpreted images and report] : I independently reviewed and interpreted images and report [I reviewed the films/CD and additionally noted] : I reviewed the films/CD and additionally noted [FreeTextEntry1] : On my interpretation of these images from [facility] on 07/17/24.  I have additionally reviewed the radiologist report. LUMBAR MRI- sagittal & axial view.  L5-S1:  LT sided mild fs L4-5: disc bulge L3-4: nl L2-3: nl   L1-2: nl  T12-L1: nl    On my interpretation of these images from 6/22/24 [facility] I have additionally reviewed the radiologist report. CERVICAL MRI C2-3: normal C3-4: mild DDD, mild BL FS C4-5: disc bulge C5-6: mild FS, mild facet hypertrophy. C6-7: normal C7-T1: normal

## 2024-10-05 NOTE — IMAGING
[de-identified] : The patient is a well appearing 34 year male of their stated age. Neck is supple & nontender to palpation. Negative Spurling's test.   Effected Shoulder: LEFT Inspection: Scapula Winging: Negative Deformity: None Erythema: None Ecchymosis: None Abrasions: None Effusion: None   Range of Motion: Active Forward Flexion: 170 degrees Active Abduction: 170 degrees Passive Forward Flexion: 170 degrees Passive Abduction: 170 degrees ER @ 90 degrees: 90degrees IR @ 90 degrees: 20 degrees ER @ 0 degrees: 35 degrees   Motor Exam: Forward Flexion: 5 out of 5 Flexion Plane of Scapula: 5 out of 5 Abduction: 5 out of 5 Internal Rotation: 5 out of 5 External Rotation: 5 out of 5 Distal Motor Strength: 5 out of 5   Stability Testing:  Anterior: 1+ Posterior: 1+ Sulcus N: 1+ Sulcus ER: 1+ Provocative Tests: Drop Arm: Negative Impingement: Negative Lynn: Negative X-Arm Adduction: Negative Belly Press: Negative Bear Hug: Negative Lift Off: Negative Apprehension: Negative Relocation: Negative Posterior Load & Shift: Negative Palpation: AC Joint: Nontender Clavicle: Nontender SC Joint: Nontender Bicipital Groove: Nontender Coracoid Process: TENDER  Pectoralis Minor Tendon: Nontender Pectoralis Major Tendon: Nontender & palpably intact Latissimus Dorsi: Nontender Proximal Humerus: Nontender Scapula Body: MID SCAPULA TENDERNESS Medial Scapula Boarder: Nontender Scapula Spine: Nontender Neurologic Exam: Sensation to Light Touch: Axillary: Grossly intact Ulnar: Grossly intact Radial: Grossly intact Median: Grossly intact Other:  N/A Circulatory/Pulses: Ulnar: 2+ Radial: 2+ Other Pertinent Findings: None  >>>>>>>>>>>>>>>>>>>>>>>>>>>>>>>>>>>>>>>>>>>>>>>>>>>>>>>>>>>>>>>>>>>>>>>   Effected Shoulder: RIGHT Inspection: Scapula Winging: Negative Deformity: None Erythema: None Ecchymosis: None Abrasions: None Effusion: None   Range of Motion: Active Forward Flexion: 180 degrees Active Abduction: 180 degrees Passive Forward Flexion: 180 degrees Passive Abduction: 180 degrees ER @ 90 degrees: 90 degrees IR @ 90 degrees: 35 degrees ER @ 0 degrees: 35 degrees   Motor Exam: Forward Flexion: 5 out of 5 Flexion Plane of Scapula: 5- out of 5 Abduction: 5- out of 5 Internal Rotation: 5 out of 5 External Rotation: 5 out of 5 Distal Motor Strength: 5 out of 5   Stability Testing: Anterior: 1+ Posterior: 1+ Sulcus N: 1+ Sulcus ER: 1+   Provocative Tests: Drop Arm: Negative Impingement: Negative Lynn: Negative X-Arm Adduction: Negative Belly Press: Negative Bear Hug: Negative Lift Off: Negative Apprehension: Negative Relocation: Negative Posterior Load & Shift: Negative   Palpation: AC Joint: Nontender Nontender Clavicle: Nontender SC Joint: Nontender Bicipital Groove: Nontender Coracoid Process: TENDER  Pectoralis Minor Tendon: Nontender Pectoralis Major Tendon: Nontender & palpably intact Latissimus Dorsi: Nontender Proximal Humerus: Nontender Scapula Body: Nontender Medial Scapula Boarder: Nontender Scapula Spine: Nontender   Neurologic Exam: Sensation to Light Touch: Axillary: Grossly intact Ulnar: Grossly intact Radial: Grossly intact Median: Grossly intact Other:  N/A Circulatory/Pulses: Ulnar: 2+ Radial: 2+ Other Pertinent Findings: None   Assessment: The patient is a 34 year old male with bilateral shoulder pain and radiographic and physical exam findings consistent with cervical disc herniation The patient's condition is acute Documents/Results Reviewed Today: MRI cervical spine Tests/Studies Independently Interpreted Today: MRI cervical spine reveals evidence of C5-C6, C6-C7 disc herniation worse on left X-Ray of cervical spine reveals evidence of straightening of normal lordotic curve otherwise benign  Pertinent findings include: RIGHT SHOULDER: 180/180/90/35/35, 5-/5 ABD, 5/5 FF, 5-/5 FSP,5-/5 IR AND ER, Tender coracoid,  LEFT SHOUDLER: Mid scap border tenderness, Tender Coracoid, 170/170/90/20/35,  Confounding medical conditions/concerns:  Left shoulder Labral repair with Dr. Corado at [facility] in 2017   Plan: Discussed treatment options for the patient's disc herniation. Patient will start physical therapy, HEP and stretching. The patient is prescribed a Medrol dose pack to be taken as directed for the next five days - no NSAID medication should be taken concurrently with steroid pack. Upon completion of MDP, the patient may take OTC oral NSAIDs as needed. He will follow up with pain Managment.  Tests Ordered: None Prescription Medications Ordered: Medrol dose pack  Braces/DME Ordered: None Activity/Work/Sports Status: None  Additional Instructions: None Follow-Up: with pain management   The patient's current medication management of their orthopedic diagnosis was reviewed today: (1) We discussed a comprehensive treatment plan that included possible pharmaceutical management involving the use of prescription strength medications including but not limited to options such as oral Naprosyn 500mg BID, once daily Meloxicam 15 mg, or 500-650 mg Tylenol versus over the counter oral medications and topical prescription NSAID Pennsaid vs over the counter Voltaren gel.  Based on our extensive discussion, the patient was prescribed a Medrol Dose Pack to be taken as directed for the next five days.  Following which OTC NSAIDs may be used PRN for pain, inflammation, and discomfort.  No NSAID medications should be taken concurrently with the Medrol Dose Pack. (2) There is a moderate risk of morbidity with further treatment, especially from use of prescription strength medications and possible side effects of these medications which include upset stomach with oral medications, skin reactions to topical medications and cardiac/renal issues with long term use. (3) I recommended that the patient follow-up with their medical physician to discuss any significant specific potential issues with long term medication use such as interactions with current medications or with exacerbation of underlying medical comorbidities. (4) The benefits and risks associated with use of injectable, oral or topical, prescription and over the counter anti-inflammatory medications were discussed with the patient. The patient voiced understanding of the risks including but not limited to bleeding, stroke, kidney dysfunction, heart disease, and were referred to the black box warning label for further information.  Celine BOYLE attest that this documentation has been prepared under the direction and in the presence of Provider Dr. Kumar Corado.  The documentation recorded by the scribe accurately reflects the services Dr. Kumar BOYLE, personally performed and the decisions made by me.

## 2024-10-05 NOTE — PHYSICAL EXAM
[Bending to left] : bending to left [Bending to right] : bending to right [de-identified] : mild + alarcon on LT.  [Normal Coordination] : normal coordination [Normal DTR UE/LE] : normal DTR UE/LE  [Normal Sensation] : normal sensation [Normal Mood and Affect] : normal mood and affect [Oriented] : oriented [Normal Skin] : normal skin [No Rash] : no rash [No Ulcers] : no ulcers [No Lesions] : no lesions [No obvious lymphadenopathy in areas examined] : no obvious lymphadenopathy in areas examined [NL (45)] : right lateral flexion 45 degrees [NL (80)] : right lateral rotation 80 degrees [Biceps 2+] : biceps 2+ [Triceps 2+] : triceps 2+ [Brachioradialis 2+] : brachioradialis 2+ [NL (90)] : forward flexion 90 degrees [NL (30)] : right lateral bending 30 degrees [5___] : right extensor hallicus longus 5[unfilled]/5 [No bony abnormalities] : No bony abnormalities [TWNoteComboBox7] : False [] : non-antalgic [de-identified] : False [de-identified] : False [de-identified] : False

## 2024-10-05 NOTE — DATA REVIEWED
[MRI] : MRI [Cervical Spine] : cervical spine [Report was reviewed and noted in the chart] : The report was reviewed and noted in the chart [I independently reviewed and interpreted images and report] : I independently reviewed and interpreted images and report [I reviewed the films/CD and additionally noted] : I reviewed the films/CD and additionally noted [FreeTextEntry1] : On my interpretation of these images from [facility] on 07/17/24.  I have additionally reviewed the radiologist report. LUMBAR MRI- sagittal & axial view.  L5-S1:  LT sided mild fs L4-5: disc bulge L3-4: nl L2-3: nl   L1-2: nl  T12-L1: nl    On my interpretation of these images from 6/22/24 [facility] I have additionally reviewed the radiologist report. CERVICAL MRI C2-3: normal C3-4: mild DDD, mild BL FS C4-5: disc bulge C5-6: mild FS, mild facet hypertrophy. C6-7: normal C7-T1: normal

## 2024-10-05 NOTE — PHYSICAL EXAM
[Bending to left] : bending to left [Bending to right] : bending to right [de-identified] : mild + alarcon on LT.  [Normal Coordination] : normal coordination [Normal DTR UE/LE] : normal DTR UE/LE  [Normal Sensation] : normal sensation [Normal Mood and Affect] : normal mood and affect [Oriented] : oriented [Normal Skin] : normal skin [No Rash] : no rash [No Ulcers] : no ulcers [No Lesions] : no lesions [No obvious lymphadenopathy in areas examined] : no obvious lymphadenopathy in areas examined [NL (45)] : right lateral flexion 45 degrees [NL (80)] : right lateral rotation 80 degrees [Biceps 2+] : biceps 2+ [Triceps 2+] : triceps 2+ [Brachioradialis 2+] : brachioradialis 2+ [NL (90)] : forward flexion 90 degrees [NL (30)] : right lateral bending 30 degrees [5___] : right extensor hallicus longus 5[unfilled]/5 [No bony abnormalities] : No bony abnormalities [TWNoteComboBox7] : False [] : non-antalgic [de-identified] : False [de-identified] : False [de-identified] : False
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
## 2024-10-05 NOTE — DATA REVIEWED
[MRI] : MRI [Cervical Spine] : cervical spine [Report was reviewed and noted in the chart] : The report was reviewed and noted in the chart [I independently reviewed and interpreted images and report] : I independently reviewed and interpreted images and report [I reviewed the films/CD and additionally noted] : I reviewed the films/CD and additionally noted [FreeTextEntry1] : On my interpretation of these images from [facility] on 07/17/24.  I have additionally reviewed the radiologist report. LUMBAR MRI- sagittal & axial view.  L5-S1:  LT sided mild fs L4-5: disc bulge L3-4: nl L2-3: nl   L1-2: nl  T12-L1: nl    On my interpretation of these images from 6/22/24 [facility] I have additionally reviewed the radiologist report. CERVICAL MRI C2-3: normal C3-4: mild DDD, mild BL FS C4-5: disc bulge C5-6: mild FS, mild facet hypertrophy. C6-7: normal C7-T1: normal
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
## 2024-10-05 NOTE — DISCUSSION/SUMMARY
[de-identified] : 34 M with cervical & lumbar spondylosis.  Symptoms appear mostly muscular in nature. Normal neuro cervical & lumbar exam today. Best to treat conservatively at this time. He already has established care with Dr. Andres, may follow up if needed. Patient will continue with current physical therapy routine, renewal given due to patient's overall reduction in pain and mobility continuing cervical & lumbar physical therapy will be helpful at this time.  Medical massage prescription given today to aid in symptom control. Discussed treating with acupuncture if pt wishes.   Moving forward I'd like to see as needed.   Prior to appointment and during encounter with patient extensive medical records were reviewed including but not limited to, hospital records, out patient records, imaging results, and lab data. During this appointment the patient was examined, diagnoses were discussed and explained in a face to face manner. In addition extensive time was spent reviewing aforementioned diagnostic studies. Counseling including abnormal image results, differential diagnoses, treatment options, risk and benefits, lifestyle changes, current condition, and current medications was performed. Patient's comments, questions, and concerns were address and patient verbalized understanding. Based on this patient's presentation at our office, which is an orthopedic spine surgeon's office, this patient inherently / intrinsically has a risk, however minute, of developing issues such as Cauda equina syndrome, bowel and bladder changes, or progression of motor or neurological deficits such as paralysis which may be permanent.   I, Belinda Olvera, attest that this documentation has been prepared under the direction and in the presence of provider Markel James MD.

## 2024-10-05 NOTE — PHYSICAL EXAM
[Bending to left] : bending to left [Bending to right] : bending to right [de-identified] : mild + alarcon on LT.  [Normal Coordination] : normal coordination [Normal DTR UE/LE] : normal DTR UE/LE  [Normal Sensation] : normal sensation [Normal Mood and Affect] : normal mood and affect [Oriented] : oriented [Normal Skin] : normal skin [No Rash] : no rash [No Ulcers] : no ulcers [No Lesions] : no lesions [No obvious lymphadenopathy in areas examined] : no obvious lymphadenopathy in areas examined [NL (45)] : right lateral flexion 45 degrees [NL (80)] : right lateral rotation 80 degrees [Biceps 2+] : biceps 2+ [Triceps 2+] : triceps 2+ [Brachioradialis 2+] : brachioradialis 2+ [NL (90)] : forward flexion 90 degrees [NL (30)] : right lateral bending 30 degrees [5___] : right extensor hallicus longus 5[unfilled]/5 [No bony abnormalities] : No bony abnormalities [TWNoteComboBox7] : False [] : non-antalgic [de-identified] : False [de-identified] : False [de-identified] : False

## 2024-10-05 NOTE — IMAGING
[de-identified] : The patient is a well appearing 34 year male of their stated age. Neck is supple & nontender to palpation. Negative Spurling's test.   Effected Shoulder: LEFT Inspection: Scapula Winging: Negative Deformity: None Erythema: None Ecchymosis: None Abrasions: None Effusion: None   Range of Motion: Active Forward Flexion: 170 degrees Active Abduction: 170 degrees Passive Forward Flexion: 170 degrees Passive Abduction: 170 degrees ER @ 90 degrees: 90degrees IR @ 90 degrees: 20 degrees ER @ 0 degrees: 35 degrees   Motor Exam: Forward Flexion: 5 out of 5 Flexion Plane of Scapula: 5 out of 5 Abduction: 5 out of 5 Internal Rotation: 5 out of 5 External Rotation: 5 out of 5 Distal Motor Strength: 5 out of 5   Stability Testing:  Anterior: 1+ Posterior: 1+ Sulcus N: 1+ Sulcus ER: 1+ Provocative Tests: Drop Arm: Negative Impingement: Negative Chester: Negative X-Arm Adduction: Negative Belly Press: Negative Bear Hug: Negative Lift Off: Negative Apprehension: Negative Relocation: Negative Posterior Load & Shift: Negative Palpation: AC Joint: Nontender Clavicle: Nontender SC Joint: Nontender Bicipital Groove: Nontender Coracoid Process: TENDER  Pectoralis Minor Tendon: Nontender Pectoralis Major Tendon: Nontender & palpably intact Latissimus Dorsi: Nontender Proximal Humerus: Nontender Scapula Body: MID SCAPULA TENDERNESS Medial Scapula Boarder: Nontender Scapula Spine: Nontender Neurologic Exam: Sensation to Light Touch: Axillary: Grossly intact Ulnar: Grossly intact Radial: Grossly intact Median: Grossly intact Other:  N/A Circulatory/Pulses: Ulnar: 2+ Radial: 2+ Other Pertinent Findings: None  >>>>>>>>>>>>>>>>>>>>>>>>>>>>>>>>>>>>>>>>>>>>>>>>>>>>>>>>>>>>>>>>>>>>>>>   Effected Shoulder: RIGHT Inspection: Scapula Winging: Negative Deformity: None Erythema: None Ecchymosis: None Abrasions: None Effusion: None   Range of Motion: Active Forward Flexion: 180 degrees Active Abduction: 180 degrees Passive Forward Flexion: 180 degrees Passive Abduction: 180 degrees ER @ 90 degrees: 90 degrees IR @ 90 degrees: 35 degrees ER @ 0 degrees: 35 degrees   Motor Exam: Forward Flexion: 5 out of 5 Flexion Plane of Scapula: 5- out of 5 Abduction: 5- out of 5 Internal Rotation: 5 out of 5 External Rotation: 5 out of 5 Distal Motor Strength: 5 out of 5   Stability Testing: Anterior: 1+ Posterior: 1+ Sulcus N: 1+ Sulcus ER: 1+   Provocative Tests: Drop Arm: Negative Impingement: Negative Chester: Negative X-Arm Adduction: Negative Belly Press: Negative Bear Hug: Negative Lift Off: Negative Apprehension: Negative Relocation: Negative Posterior Load & Shift: Negative   Palpation: AC Joint: Nontender Nontender Clavicle: Nontender SC Joint: Nontender Bicipital Groove: Nontender Coracoid Process: TENDER  Pectoralis Minor Tendon: Nontender Pectoralis Major Tendon: Nontender & palpably intact Latissimus Dorsi: Nontender Proximal Humerus: Nontender Scapula Body: Nontender Medial Scapula Boarder: Nontender Scapula Spine: Nontender   Neurologic Exam: Sensation to Light Touch: Axillary: Grossly intact Ulnar: Grossly intact Radial: Grossly intact Median: Grossly intact Other:  N/A Circulatory/Pulses: Ulnar: 2+ Radial: 2+ Other Pertinent Findings: None   Assessment: The patient is a 34 year old male with bilateral shoulder pain and radiographic and physical exam findings consistent with cervical disc herniation The patient's condition is acute Documents/Results Reviewed Today: MRI cervical spine Tests/Studies Independently Interpreted Today: MRI cervical spine reveals evidence of C5-C6, C6-C7 disc herniation worse on left X-Ray of cervical spine reveals evidence of straightening of normal lordotic curve otherwise benign  Pertinent findings include: RIGHT SHOULDER: 180/180/90/35/35, 5-/5 ABD, 5/5 FF, 5-/5 FSP,5-/5 IR AND ER, Tender coracoid,  LEFT SHOUDLER: Mid scap border tenderness, Tender Coracoid, 170/170/90/20/35,  Confounding medical conditions/concerns:  Left shoulder Labral repair with Dr. Corado at [facility] in 2017   Plan: Discussed treatment options for the patient's disc herniation. Patient will start physical therapy, HEP and stretching. The patient is prescribed a Medrol dose pack to be taken as directed for the next five days - no NSAID medication should be taken concurrently with steroid pack. Upon completion of MDP, the patient may take OTC oral NSAIDs as needed. He will follow up with pain Managment.  Tests Ordered: None Prescription Medications Ordered: Medrol dose pack  Braces/DME Ordered: None Activity/Work/Sports Status: None  Additional Instructions: None Follow-Up: with pain management   The patient's current medication management of their orthopedic diagnosis was reviewed today: (1) We discussed a comprehensive treatment plan that included possible pharmaceutical management involving the use of prescription strength medications including but not limited to options such as oral Naprosyn 500mg BID, once daily Meloxicam 15 mg, or 500-650 mg Tylenol versus over the counter oral medications and topical prescription NSAID Pennsaid vs over the counter Voltaren gel.  Based on our extensive discussion, the patient was prescribed a Medrol Dose Pack to be taken as directed for the next five days.  Following which OTC NSAIDs may be used PRN for pain, inflammation, and discomfort.  No NSAID medications should be taken concurrently with the Medrol Dose Pack. (2) There is a moderate risk of morbidity with further treatment, especially from use of prescription strength medications and possible side effects of these medications which include upset stomach with oral medications, skin reactions to topical medications and cardiac/renal issues with long term use. (3) I recommended that the patient follow-up with their medical physician to discuss any significant specific potential issues with long term medication use such as interactions with current medications or with exacerbation of underlying medical comorbidities. (4) The benefits and risks associated with use of injectable, oral or topical, prescription and over the counter anti-inflammatory medications were discussed with the patient. The patient voiced understanding of the risks including but not limited to bleeding, stroke, kidney dysfunction, heart disease, and were referred to the black box warning label for further information.  Celine BOYLE attest that this documentation has been prepared under the direction and in the presence of Provider Dr. Kumar Corado.  The documentation recorded by the scribe accurately reflects the services Dr. Kumar BOYLE, personally performed and the decisions made by me.

## 2024-10-05 NOTE — DISCUSSION/SUMMARY
[de-identified] : 34 M with cervical & lumbar spondylosis.  Symptoms appear mostly muscular in nature. Normal neuro cervical & lumbar exam today. Best to treat conservatively at this time. He already has established care with Dr. Andres, may follow up if needed. Patient will continue with current physical therapy routine, renewal given due to patient's overall reduction in pain and mobility continuing cervical & lumbar physical therapy will be helpful at this time.  Medical massage prescription given today to aid in symptom control. Discussed treating with acupuncture if pt wishes.   Moving forward I'd like to see as needed.   Prior to appointment and during encounter with patient extensive medical records were reviewed including but not limited to, hospital records, out patient records, imaging results, and lab data. During this appointment the patient was examined, diagnoses were discussed and explained in a face to face manner. In addition extensive time was spent reviewing aforementioned diagnostic studies. Counseling including abnormal image results, differential diagnoses, treatment options, risk and benefits, lifestyle changes, current condition, and current medications was performed. Patient's comments, questions, and concerns were address and patient verbalized understanding. Based on this patient's presentation at our office, which is an orthopedic spine surgeon's office, this patient inherently / intrinsically has a risk, however minute, of developing issues such as Cauda equina syndrome, bowel and bladder changes, or progression of motor or neurological deficits such as paralysis which may be permanent.   I, Belinda Olvera, attest that this documentation has been prepared under the direction and in the presence of provider Markel James MD.

## 2024-10-05 NOTE — DATA REVIEWED
[MRI] : MRI [Cervical Spine] : cervical spine [Report was reviewed and noted in the chart] : The report was reviewed and noted in the chart [I independently reviewed and interpreted images and report] : I independently reviewed and interpreted images and report [I reviewed the films/CD and additionally noted] : I reviewed the films/CD and additionally noted [FreeTextEntry1] : On my interpretation of these images from [facility] on 07/17/24.  I have additionally reviewed the radiologist report. LUMBAR MRI- sagittal & axial view.  L5-S1:  LT sided mild fs L4-5: disc bulge L3-4: nl L2-3: nl   L1-2: nl  T12-L1: nl    On my interpretation of these images from 6/22/24 [facility] I have additionally reviewed the radiologist report. CERVICAL MRI C2-3: normal C3-4: mild DDD, mild BL FS C4-5: disc bulge C5-6: mild FS, mild facet hypertrophy. C6-7: normal C7-T1: normal

## 2024-10-05 NOTE — IMAGING
[de-identified] : The patient is a well appearing 34 year male of their stated age. Neck is supple & nontender to palpation. Negative Spurling's test.   Effected Shoulder: LEFT Inspection: Scapula Winging: Negative Deformity: None Erythema: None Ecchymosis: None Abrasions: None Effusion: None   Range of Motion: Active Forward Flexion: 170 degrees Active Abduction: 170 degrees Passive Forward Flexion: 170 degrees Passive Abduction: 170 degrees ER @ 90 degrees: 90degrees IR @ 90 degrees: 20 degrees ER @ 0 degrees: 35 degrees   Motor Exam: Forward Flexion: 5 out of 5 Flexion Plane of Scapula: 5 out of 5 Abduction: 5 out of 5 Internal Rotation: 5 out of 5 External Rotation: 5 out of 5 Distal Motor Strength: 5 out of 5   Stability Testing:  Anterior: 1+ Posterior: 1+ Sulcus N: 1+ Sulcus ER: 1+ Provocative Tests: Drop Arm: Negative Impingement: Negative [place]: Negative X-Arm Adduction: Negative Belly Press: Negative Bear Hug: Negative Lift Off: Negative Apprehension: Negative Relocation: Negative Posterior Load & Shift: Negative Palpation: AC Joint: Nontender Clavicle: Nontender SC Joint: Nontender Bicipital Groove: Nontender Coracoid Process: TENDER  Pectoralis Minor Tendon: Nontender Pectoralis Major Tendon: Nontender & palpably intact Latissimus Dorsi: Nontender Proximal Humerus: Nontender Scapula Body: MID SCAPULA TENDERNESS Medial Scapula Boarder: Nontender Scapula Spine: Nontender Neurologic Exam: Sensation to Light Touch: Axillary: Grossly intact Ulnar: Grossly intact Radial: Grossly intact Median: Grossly intact Other:  N/A Circulatory/Pulses: Ulnar: 2+ Radial: 2+ Other Pertinent Findings: None  >>>>>>>>>>>>>>>>>>>>>>>>>>>>>>>>>>>>>>>>>>>>>>>>>>>>>>>>>>>>>>>>>>>>>>>   Effected Shoulder: RIGHT Inspection: Scapula Winging: Negative Deformity: None Erythema: None Ecchymosis: None Abrasions: None Effusion: None   Range of Motion: Active Forward Flexion: 180 degrees Active Abduction: 180 degrees Passive Forward Flexion: 180 degrees Passive Abduction: 180 degrees ER @ 90 degrees: 90 degrees IR @ 90 degrees: 35 degrees ER @ 0 degrees: 35 degrees   Motor Exam: Forward Flexion: 5 out of 5 Flexion Plane of Scapula: 5- out of 5 Abduction: 5- out of 5 Internal Rotation: 5 out of 5 External Rotation: 5 out of 5 Distal Motor Strength: 5 out of 5   Stability Testing: Anterior: 1+ Posterior: 1+ Sulcus N: 1+ Sulcus ER: 1+   Provocative Tests: Drop Arm: Negative Impingement: Negative [place]: Negative X-Arm Adduction: Negative Belly Press: Negative Bear Hug: Negative Lift Off: Negative Apprehension: Negative Relocation: Negative Posterior Load & Shift: Negative   Palpation: AC Joint: Nontender Nontender Clavicle: Nontender SC Joint: Nontender Bicipital Groove: Nontender Coracoid Process: TENDER  Pectoralis Minor Tendon: Nontender Pectoralis Major Tendon: Nontender & palpably intact Latissimus Dorsi: Nontender Proximal Humerus: Nontender Scapula Body: Nontender Medial Scapula Boarder: Nontender Scapula Spine: Nontender   Neurologic Exam: Sensation to Light Touch: Axillary: Grossly intact Ulnar: Grossly intact Radial: Grossly intact Median: Grossly intact Other:  N/A Circulatory/Pulses: Ulnar: 2+ Radial: 2+ Other Pertinent Findings: None   Assessment: The patient is a 34 year old male with bilateral shoulder pain and radiographic and physical exam findings consistent with cervical disc herniation The patient's condition is acute Documents/Results Reviewed Today: MRI cervical spine Tests/Studies Independently Interpreted Today: MRI cervical spine reveals evidence of C5-C6, C6-C7 disc herniation worse on left X-Ray of cervical spine reveals evidence of straightening of normal lordotic curve otherwise benign  Pertinent findings include: RIGHT SHOULDER: 180/180/90/35/35, 5-/5 ABD, 5/5 FF, 5-/5 FSP,5-/5 IR AND ER, Tender coracoid,  LEFT SHOUDLER: Mid scap border tenderness, Tender Coracoid, 170/170/90/20/35,  Confounding medical conditions/concerns:  Left shoulder Labral repair with Dr. Corado at [facility] in 2017   Plan: Discussed treatment options for the patient's disc herniation. Patient will start physical therapy, HEP and stretching. The patient is prescribed a Medrol dose pack to be taken as directed for the next five days - no NSAID medication should be taken concurrently with steroid pack. Upon completion of MDP, the patient may take OTC oral NSAIDs as needed. He will follow up with pain Managment.  Tests Ordered: None Prescription Medications Ordered: Medrol dose pack  Braces/DME Ordered: None Activity/Work/Sports Status: None  Additional Instructions: None Follow-Up: with pain management   The patient's current medication management of their orthopedic diagnosis was reviewed today: (1) We discussed a comprehensive treatment plan that included possible pharmaceutical management involving the use of prescription strength medications including but not limited to options such as oral Naprosyn 500mg BID, once daily Meloxicam 15 mg, or 500-650 mg Tylenol versus over the counter oral medications and topical prescription NSAID Pennsaid vs over the counter Voltaren gel.  Based on our extensive discussion, the patient was prescribed a Medrol Dose Pack to be taken as directed for the next five days.  Following which OTC NSAIDs may be used PRN for pain, inflammation, and discomfort.  No NSAID medications should be taken concurrently with the Medrol Dose Pack. (2) There is a moderate risk of morbidity with further treatment, especially from use of prescription strength medications and possible side effects of these medications which include upset stomach with oral medications, skin reactions to topical medications and cardiac/renal issues with long term use. (3) I recommended that the patient follow-up with their medical physician to discuss any significant specific potential issues with long term medication use such as interactions with current medications or with exacerbation of underlying medical comorbidities. (4) The benefits and risks associated with use of injectable, oral or topical, prescription and over the counter anti-inflammatory medications were discussed with the patient. The patient voiced understanding of the risks including but not limited to bleeding, stroke, kidney dysfunction, heart disease, and were referred to the black box warning label for further information.  Celine BOYLE attest that this documentation has been prepared under the direction and in the presence of Provider Dr. Kumar Corado.  The documentation recorded by the scribe accurately reflects the services Dr. Kumar BOYLE, personally performed and the decisions made by me.